# Patient Record
Sex: FEMALE | Race: OTHER | Employment: UNEMPLOYED | URBAN - METROPOLITAN AREA
[De-identification: names, ages, dates, MRNs, and addresses within clinical notes are randomized per-mention and may not be internally consistent; named-entity substitution may affect disease eponyms.]

---

## 2020-11-19 ENCOUNTER — HOSPITAL ENCOUNTER (OUTPATIENT)
Dept: MAMMOGRAPHY | Age: 40
Discharge: HOME | End: 2020-11-19
Attending: NURSE PRACTITIONER | Admitting: NURSE PRACTITIONER

## 2020-11-19 ENCOUNTER — OFFICE VISIT (OUTPATIENT)
Dept: FAMILY PLANNING/WOMEN'S HEALTH CLINIC | Age: 40
End: 2020-11-19

## 2020-11-19 ENCOUNTER — HOSPITAL ENCOUNTER (OUTPATIENT)
Dept: MAMMOGRAPHY | Age: 40
Discharge: HOME | End: 2020-11-19
Attending: NURSE PRACTITIONER

## 2020-11-19 DIAGNOSIS — N63.10 BREAST MASS, RIGHT: Primary | ICD-10-CM

## 2020-11-19 DIAGNOSIS — N63.10 MASS OF BREAST, RIGHT: ICD-10-CM

## 2020-11-19 PROCEDURE — 77066 DX MAMMO INCL CAD BI: CPT

## 2020-11-19 PROCEDURE — 76642 ULTRASOUND BREAST LIMITED: CPT

## 2020-11-19 NOTE — PROGRESS NOTES
EVERY WOMANS LIFE HISTORY QUESTIONNAIRE       No Yes Comments   Has a doctor ever seen or felt anything wrong with your breast? []                                  [x]                                  [facility]  [facility]   Have you ever had a breast biopsy? [x]                                  []                                          When and where was last mammogram performed? This is first one    Have you ever been told that there was a problem on your mammogram?   No Yes Comments   []                                  []                                  n/a     Do you have breast implants? No Yes Comments   [x]                                  []                                       When was your last Pap test performed? 10/22/2020 at [address]    Have you ever had an abnormal Pap test?   No Yes Comments   [x]                                  []                                       Have you had a hysterectomy? No Yes Comments (why)   [x]                                  []                                       Have you been through menopause? No Yes Date of LMP   [x]                                  []                                  11/16/2020     Did your mother take LUCIE? No Yes Unknown   [x]                                  []                                       Do you have a history of HIV exposure? No Yes    [x]                                  []                                       Have you ever been diagnosed with any type of Cancer   No Yes Comments (type,when,where,type of treatment   [x]                                  []                                          Has a family member been diagnosed with breast or ovarian cancer?    No Yes Comments (which family members, and type   [x]                                  []                                       Are you taking hormone replacement therapy (HRT)     No Yes Comments   [x]                                  [] How many times have you been pregnant? 2     Number of live births ? 2    Are you experiencing any of the following? No Yes Comments   Nipple Discharge [x]                                  []                                     Breast Lump/Masses []                                  [x]                                  Lump x 3 months, right breast   Breast Skin Changes []                                  []                                          No Yes Comments   Vaginal Discharge [x]                                  []                                     Abnormal/unusual vaginal bleeding [x]                                  []                                         Are you experiencing any other health problems? C/o losing hair. ....gave CAV info        Age at first period? 13  Age at first birth?    14

## 2020-11-19 NOTE — PROGRESS NOTES
HISTORY OF PRESENT ILLNESS  Erum Palacios is a 41 y.o. female here for a breast lump. HPI  Ms. Mahogany Palacios, , has had a right breast lump for 2-3 months. It was evaluated at the [facility] and she was sent here. The lump hasn't gotten bigger. There is no pain. She denies nipple discharge, skin retraction/dimpling/changes. The left breast is normal. LMP 2020 with normal monthly cycles. She denies use of hormones or BC. She is UTD with her Pap/WWE- completed 10/2020- normal. Today will be her first mammogram.   Review of Systems   Constitutional: Negative. Skin: Negative. Physical Exam  Constitutional:       Appearance: Normal appearance. Chest:      Breasts:         Right: Mass present. No swelling, bleeding, inverted nipple, nipple discharge, skin change or tenderness. Left: No swelling, bleeding, inverted nipple, mass, nipple discharge, skin change or tenderness. Lymphadenopathy:      Upper Body:      Right upper body: No supraclavicular, axillary or pectoral adenopathy. Left upper body: No supraclavicular, axillary or pectoral adenopathy. Skin:     General: Skin is warm and dry. Neurological:      Mental Status: She is alert and oriented to person, place, and time. Psychiatric:         Mood and Affect: Mood normal.         Behavior: Behavior normal.         ASSESSMENT and PLAN  1. [facility]  2. [facility]      -right breast mass  3.  Diagnostic mammogram and US today

## 2020-12-10 ENCOUNTER — TELEPHONE (OUTPATIENT)
Dept: FAMILY PLANNING/WOMEN'S HEALTH CLINIC | Age: 40
End: 2020-12-10

## 2020-12-10 NOTE — TELEPHONE ENCOUNTER
Spoke to pt via Qire , Franky Andrews, re: cancelled appt for breast biopsy with the [facility]. Pt states she has moved out of town to [place]. We will give her the # to the [facility] in [place], which is the same as the [facility] program here in [place], tomorrow. Encouraged her to call them and stressed the importance of getting the breast biopsy done as recommended by the radiologist 11/19/2020. She states understanding. She also has our # if she has any further questions.

## 2023-08-03 ENCOUNTER — TRANSCRIBE ORDERS (OUTPATIENT)
Facility: HOSPITAL | Age: 43
End: 2023-08-03

## 2023-08-03 DIAGNOSIS — N63.0 BREAST LUMP IN FEMALE: Primary | ICD-10-CM

## 2023-08-23 ENCOUNTER — HOSPITAL ENCOUNTER (OUTPATIENT)
Facility: HOSPITAL | Age: 43
Discharge: HOME | End: 2023-08-26

## 2023-08-23 VITALS — BODY MASS INDEX: 22.58 KG/M2 | WEIGHT: 112 LBS | HEIGHT: 59 IN

## 2023-08-23 DIAGNOSIS — N63.0 BREAST LUMP IN FEMALE: ICD-10-CM

## 2023-08-23 PROCEDURE — 76642 ULTRASOUND BREAST LIMITED: CPT

## 2023-08-23 PROCEDURE — G0279 TOMOSYNTHESIS, MAMMO: HCPCS
